# Patient Record
Sex: FEMALE | Race: WHITE | Employment: FULL TIME | URBAN - METROPOLITAN AREA
[De-identification: names, ages, dates, MRNs, and addresses within clinical notes are randomized per-mention and may not be internally consistent; named-entity substitution may affect disease eponyms.]

---

## 2018-03-04 ENCOUNTER — HOSPITAL ENCOUNTER (EMERGENCY)
Facility: CLINIC | Age: 38
Discharge: HOME | End: 2018-03-05
Attending: EMERGENCY MEDICINE
Payer: COMMERCIAL

## 2018-03-04 DIAGNOSIS — B02.9 HERPES ZOSTER WITHOUT COMPLICATION: Primary | ICD-10-CM

## 2018-03-04 PROCEDURE — 99282 EMERGENCY DEPT VISIT SF MDM: CPT

## 2018-03-04 ASSESSMENT — PAIN SCALES - GENERAL: PAINLEVEL_OUTOF10: 6

## 2018-03-04 ASSESSMENT — PAIN DESCRIPTION - FREQUENCY: FREQUENCY: CONTINUOUS

## 2018-03-04 ASSESSMENT — ENCOUNTER SYMPTOMS
GASTROINTESTINAL NEGATIVE: 1
RESPIRATORY NEGATIVE: 1
EYES NEGATIVE: 1

## 2018-03-04 ASSESSMENT — PAIN DESCRIPTION - DESCRIPTORS: DESCRIPTORS: SHARP;BURNING

## 2018-03-04 ASSESSMENT — PAIN DESCRIPTION - ORIENTATION: ORIENTATION: LEFT

## 2018-03-04 ASSESSMENT — PAIN DESCRIPTION - PAIN TYPE: TYPE: ACUTE PAIN

## 2018-03-04 ASSESSMENT — PAIN DESCRIPTION - LOCATION: LOCATION: FACE

## 2018-03-05 VITALS
BODY MASS INDEX: 38.57 KG/M2 | HEART RATE: 84 BPM | WEIGHT: 240 LBS | SYSTOLIC BLOOD PRESSURE: 131 MMHG | DIASTOLIC BLOOD PRESSURE: 88 MMHG | HEIGHT: 66 IN | TEMPERATURE: 98.3 F | RESPIRATION RATE: 15 BRPM | OXYGEN SATURATION: 98 %

## 2018-03-05 NOTE — ED NOTES
Pt states she was diagnosed with Shingles on Friday . Pt now c/o worsening pain and the rash spreading.  Pt also c/o left ear pain and was diagnosed with an daryl media and placed on Amoxicillin     Silvia Mendez RN  03/04/18 9879

## 2018-03-05 NOTE — ED PROVIDER NOTES
Examination the patient's left face does reveal a herpes zoster rash that is on the left-hand side of her face and is on her lips but not on her nose. This is not also involve the substance of the eye itself it does go up to the border of the lower eyelid. Eyes: Conjunctivae and EOM are normal. Pupils are equal, round, and reactive to light. Right eye exhibits no discharge. Left eye exhibits no discharge. No scleral icterus. Neck: Normal range of motion. Neck supple. Cardiovascular: Normal rate and regular rhythm. Pulmonary/Chest: Effort normal and breath sounds normal.   Abdominal: Soft. Bowel sounds are normal.   Musculoskeletal: Normal range of motion. Neurological: She is alert and oriented to person, place, and time. Skin: Skin is warm. Capillary refill takes 2 to 3 seconds. Psychiatric: She has a normal mood and affect. Vitals reviewed. DIFFERENTIAL DIAGNOSIS/ MDM:         DIAGNOSTIC RESULTS     EKG: All EKG's are interpreted by the Emergency Department Physician who either signs or Co-signs this chart in the absence of a cardiologist.        Not indicated unless otherwise documented above    LABS:  No results found for this visit on 03/04/18. Not indicated unless otherwise documented above    RADIOLOGY:   I reviewed the radiologist interpretations:  No orders to display       Not indicated unless otherwise documented above    EMERGENCY DEPARTMENT COURSE:     The patient was given the following medications:  No orders of the defined types were placed in this encounter.        Vitals:    Vitals:    03/04/18 2329 03/04/18 2332   BP:  (!) 136/98   Pulse: 90    Resp: 15    Temp: 98.3 °F (36.8 °C)    TempSrc: Oral    SpO2: 97%    Weight: 108.9 kg (240 lb)    Height: 5' 6\" (1.676 m)      -------------------------  BP (!) 136/98   Pulse 90   Temp 98.3 °F (36.8 °C) (Oral)   Resp 15   Ht 5' 6\" (1.676 m)   Wt 108.9 kg (240 lb)   LMP 02/24/2018   SpO2 97%   BMI 38.74 kg/m²         I have reviewed the disposition diagnosis with the patient and or their family/guardian. I have answered their questions and given discharge instructions. They voiced understanding of these instructions and did not have any further questions or complaints. CRITICAL CARE:    None    CONSULTS:    None    PROCEDURES:    None      OARRS Report if indicated             FINAL IMPRESSION      1.  Herpes zoster without complication          DISPOSITION/PLAN   DISPOSITION Decision To Discharge      PATIENT REFERRED TO:  Ortiz Crisostomo MD  [address], [address]  [facility] [address]  [phone]    In 1 day        DISCHARGE MEDICATIONS:  New Prescriptions    No medications on file       (Please note that portions of this note were completed with a voice recognition program.  Efforts were made to edit the dictations but occasionally words are mis-transcribed.)    Churchill MD  Attending Emergency Physician             Gustavo Tamayo MD  03/06/18 1191

## 2024-07-20 RX ORDER — M-VIT,TX,IRON,MINS/CALC/FOLIC 27MG-0.4MG
1 TABLET ORAL DAILY
COMMUNITY

## 2024-10-03 ENCOUNTER — INITIAL CONSULT (OUTPATIENT)
Age: 44
End: 2024-10-03
Payer: COMMERCIAL

## 2024-10-03 ENCOUNTER — HOSPITAL ENCOUNTER (OUTPATIENT)
Age: 44
Discharge: HOME | End: 2024-10-03
Payer: COMMERCIAL

## 2024-10-03 VITALS — DIASTOLIC BLOOD PRESSURE: 80 MMHG | SYSTOLIC BLOOD PRESSURE: 116 MMHG

## 2024-10-03 DIAGNOSIS — N94.6 DYSMENORRHEA: Primary | ICD-10-CM

## 2024-10-03 DIAGNOSIS — N92.0 MENORRHAGIA WITH REGULAR CYCLE: ICD-10-CM

## 2024-10-03 DIAGNOSIS — T83.32XD IUD MIGRATION, SUBSEQUENT ENCOUNTER: ICD-10-CM

## 2024-10-03 DIAGNOSIS — Z41.9 ELECTIVE SURGERY: ICD-10-CM

## 2024-10-03 LAB
ERYTHROCYTE [DISTWIDTH] IN BLOOD BY AUTOMATED COUNT: 17.4 % (ref 11.8–14.4)
HCT VFR BLD AUTO: 33.2 % (ref 36.3–47.1)
HGB BLD-MCNC: 9.5 G/DL (ref 11.9–15.1)
MCH RBC QN AUTO: 21.4 PG (ref 25.2–33.5)
MCHC RBC AUTO-ENTMCNC: 28.6 G/DL (ref 28.4–34.8)
MCV RBC AUTO: 74.9 FL (ref 82.6–102.9)
NRBC BLD-RTO: 0 PER 100 WBC
PLATELET # BLD AUTO: 380 K/UL (ref 138–453)
PMV BLD AUTO: 9.6 FL (ref 8.1–13.5)
RBC # BLD AUTO: 4.43 M/UL (ref 3.95–5.11)
WBC OTHER # BLD: 7.7 K/UL (ref 3.5–11.3)

## 2024-10-03 PROCEDURE — 99213 OFFICE O/P EST LOW 20 MIN: CPT | Performed by: OBSTETRICS & GYNECOLOGY

## 2024-10-03 PROCEDURE — G8484 FLU IMMUNIZE NO ADMIN: HCPCS | Performed by: OBSTETRICS & GYNECOLOGY

## 2024-10-03 PROCEDURE — 85027 COMPLETE CBC AUTOMATED: CPT

## 2024-10-03 PROCEDURE — G8421 BMI NOT CALCULATED: HCPCS | Performed by: OBSTETRICS & GYNECOLOGY

## 2024-10-03 PROCEDURE — 36415 COLL VENOUS BLD VENIPUNCTURE: CPT

## 2024-10-03 PROCEDURE — G8427 DOCREV CUR MEDS BY ELIG CLIN: HCPCS | Performed by: OBSTETRICS & GYNECOLOGY

## 2024-10-03 PROCEDURE — 1036F TOBACCO NON-USER: CPT | Performed by: OBSTETRICS & GYNECOLOGY

## 2024-10-03 RX ORDER — POLYETHYLENE GLYCOL 3350 17 G/17G
POWDER, FOR SOLUTION ORAL
COMMUNITY
Start: 2024-02-01

## 2024-10-03 NOTE — PATIENT INSTRUCTIONS
EKG, and Covid screen.  *If MRSA positive, please refer to the MRSA protocol below to obtain appropriate pre-operative therapies to use before your surgery.  *The anesthesiologist will talk with you at the time of your surgery regarding the specific type of anesthesia that will be administered.  Inform the anesthesiologist if you wear dentures, or have a family history of anesthetic complications.  *A member of the outpatient testing personnel will tell you where and what time to report on the day of your surgery.  *If instructed to drink carbohydrate beverages or nutrition shakes pre-operatively, please do so.    *Please bring your CPAP if used at home to the hospital.  This is important to prevent post-operative pneumonia and sleep apnea.  *Do not bring jewelry or valuables with you to the hospital.  *Stopping smoking in advance of surgery is strongly encouraged.  *STOP all blood thinners at least 5-7 days prior to your surgery.  This includes medicines such as Aspirin, Coumadin, Lovenox, Xarelto, Pradaxa, Effient, and Plavix.  *STOP all NSAID anti-inflammatories 2 days prior to surgery.  This includes medicines such as but not limited to Ibuprofen, Motrin, Aleve, Mobic, Celacoxib, and Voltaren.  *It is NOT necessary to stop hormones prior to major surgical procedures unless told otherwise.  This includes systemic birth control or post-menopausal estrogen, progesterone, testosterone, or DHEA.  Vaginal hormones are also allowed.  There is no evidence of increased risk of DVT/VTE while on hormones pre-operatively.      One Day Prior to Surgery     *ABSOLUTELY NO FOOD, LIQUID OR MEDICATION SHOULD BE TAKEN BY MOUTH AFTER MIDNIGHT OF THE DAY PRIOR TO SURGERY.  Please let Dr. Hall and the anesthesiologist know if you take any medicines required daily, have particular sensitivities to preps & medicines or surgical processes (positioning, induction, postop nausea), or have a chronic illness.        After Surgery    *The

## 2024-10-03 NOTE — PROGRESS NOTES
constipation / Anismus / Proctalgia fugax, laxative abuse, and prior treatment / surgery.    Topics of General Gynecology were revisited including gravity/parity, #'s, perineal delivery trauma, LMP, days of flow, heaviest days, # pads / tampons per day, cramps, anemia, discharge, prior STI's.    Flowsheet:  Screenings reviewed per Flowsheet including Pap smear, mammogram, dexascan, colonoscopy. Any screenings due will be ordered for this visit if patient agrees.      ROS:  Review of Systems  Pelvic pain, menstrual problem. All other systems negative.  PHYSICAL EXAM:  /80   LMP 10/02/2024 (Exact Date)     Physical Exam  Constitutional:       Appearance: Normal appearance. She is normal weight.   HENT:      Head: Normocephalic and atraumatic.      Right Ear: Tympanic membrane normal.      Left Ear: Tympanic membrane normal.      Nose: Nose normal.      Mouth/Throat:      Mouth: Mucous membranes are moist.      Pharynx: Oropharynx is clear.   Eyes:      Extraocular Movements: Extraocular movements intact.      Conjunctiva/sclera: Conjunctivae normal.   Cardiovascular:      Rate and Rhythm: Normal rate and regular rhythm.   Pulmonary:      Effort: Pulmonary effort is normal.      Breath sounds: Normal breath sounds.   Abdominal:      General: Abdomen is flat. Bowel sounds are normal.   Musculoskeletal:         General: Normal range of motion.      Cervical back: Normal range of motion and neck supple.   Neurological:      Mental Status: She is oriented to person, place, and time.   Psychiatric:         Mood and Affect: Mood normal.         Behavior: Behavior normal.   Vitals and nursing note reviewed.          VISIT RESULTS:  No results found for any visits on 10/03/24.     ASSESSMENT/PLAN:    Dysmenorrhea  Menorrhagia with regular cycle  IUD migration, subsequent encounter  Elective surgery       The patient was counseled regarding review of all conditions discussed.     Recommend Hysteroscopy D&C with IUD

## 2024-10-17 ENCOUNTER — HOSPITAL ENCOUNTER (OUTPATIENT)
Age: 44
Discharge: HOME | End: 2024-10-17
Payer: COMMERCIAL

## 2024-10-17 LAB
ABO + RH BLD: NORMAL
ARM BAND NUMBER: NORMAL
BLOOD BANK SAMPLE EXPIRATION: NORMAL
BLOOD GROUP ANTIBODIES SERPL: NEGATIVE

## 2024-10-17 PROCEDURE — 86850 RBC ANTIBODY SCREEN: CPT

## 2024-10-17 PROCEDURE — 86901 BLOOD TYPING SEROLOGIC RH(D): CPT

## 2024-10-17 PROCEDURE — 86900 BLOOD TYPING SEROLOGIC ABO: CPT

## 2024-10-17 PROCEDURE — 36415 COLL VENOUS BLD VENIPUNCTURE: CPT

## 2024-10-18 ENCOUNTER — ANESTHESIA EVENT (OUTPATIENT)
Dept: OPERATING ROOM | Age: 44
End: 2024-10-18
Payer: COMMERCIAL

## 2024-10-21 ENCOUNTER — ANESTHESIA (OUTPATIENT)
Dept: OPERATING ROOM | Age: 44
End: 2024-10-21
Payer: COMMERCIAL

## 2024-10-21 ENCOUNTER — HOSPITAL ENCOUNTER (OUTPATIENT)
Age: 44
Setting detail: OUTPATIENT SURGERY
Discharge: HOME | End: 2024-10-21
Attending: OBSTETRICS & GYNECOLOGY | Admitting: OBSTETRICS & GYNECOLOGY
Payer: COMMERCIAL

## 2024-10-21 ENCOUNTER — APPOINTMENT (OUTPATIENT)
Dept: GENERAL RADIOLOGY | Age: 44
End: 2024-10-21
Attending: OBSTETRICS & GYNECOLOGY
Payer: COMMERCIAL

## 2024-10-21 VITALS
SYSTOLIC BLOOD PRESSURE: 147 MMHG | HEART RATE: 73 BPM | DIASTOLIC BLOOD PRESSURE: 96 MMHG | OXYGEN SATURATION: 99 % | TEMPERATURE: 98 F | HEIGHT: 66 IN | WEIGHT: 252 LBS | BODY MASS INDEX: 40.5 KG/M2 | RESPIRATION RATE: 16 BRPM

## 2024-10-21 DIAGNOSIS — N94.6 DYSMENORRHEA: ICD-10-CM

## 2024-10-21 DIAGNOSIS — Z97.5 IUD (INTRAUTERINE DEVICE) IN PLACE: Primary | ICD-10-CM

## 2024-10-21 DIAGNOSIS — T83.32XD DISPLACEMENT OF INTRAUTERINE CONTRACEPTIVE DEVICE, SUBSEQUENT ENCOUNTER: ICD-10-CM

## 2024-10-21 DIAGNOSIS — G89.18 POST-OP PAIN: Primary | ICD-10-CM

## 2024-10-21 LAB — HCG, PREGNANCY URINE (POC): NEGATIVE

## 2024-10-21 PROCEDURE — 3600000004 HC SURGERY LEVEL 4 BASE: Performed by: OBSTETRICS & GYNECOLOGY

## 2024-10-21 PROCEDURE — 6360000002 HC RX W HCPCS: Performed by: SPECIALIST

## 2024-10-21 PROCEDURE — 3600000014 HC SURGERY LEVEL 4 ADDTL 15MIN: Performed by: OBSTETRICS & GYNECOLOGY

## 2024-10-21 PROCEDURE — 88305 TISSUE EXAM BY PATHOLOGIST: CPT

## 2024-10-21 PROCEDURE — 7100000000 HC PACU RECOVERY - FIRST 15 MIN: Performed by: OBSTETRICS & GYNECOLOGY

## 2024-10-21 PROCEDURE — 2709999900 HC NON-CHARGEABLE SUPPLY: Performed by: OBSTETRICS & GYNECOLOGY

## 2024-10-21 PROCEDURE — 2580000003 HC RX 258: Performed by: STUDENT IN AN ORGANIZED HEALTH CARE EDUCATION/TRAINING PROGRAM

## 2024-10-21 PROCEDURE — 7100000001 HC PACU RECOVERY - ADDTL 15 MIN: Performed by: OBSTETRICS & GYNECOLOGY

## 2024-10-21 PROCEDURE — 81025 URINE PREGNANCY TEST: CPT

## 2024-10-21 PROCEDURE — 2500000003 HC RX 250 WO HCPCS: Performed by: SPECIALIST

## 2024-10-21 PROCEDURE — C1765 ADHESION BARRIER: HCPCS | Performed by: OBSTETRICS & GYNECOLOGY

## 2024-10-21 PROCEDURE — 3700000001 HC ADD 15 MINUTES (ANESTHESIA): Performed by: OBSTETRICS & GYNECOLOGY

## 2024-10-21 PROCEDURE — 7100000010 HC PHASE II RECOVERY - FIRST 15 MIN: Performed by: OBSTETRICS & GYNECOLOGY

## 2024-10-21 PROCEDURE — 6360000002 HC RX W HCPCS: Performed by: OBSTETRICS & GYNECOLOGY

## 2024-10-21 PROCEDURE — 3700000000 HC ANESTHESIA ATTENDED CARE: Performed by: OBSTETRICS & GYNECOLOGY

## 2024-10-21 PROCEDURE — 72170 X-RAY EXAM OF PELVIS: CPT

## 2024-10-21 PROCEDURE — 7100000011 HC PHASE II RECOVERY - ADDTL 15 MIN: Performed by: OBSTETRICS & GYNECOLOGY

## 2024-10-21 RX ORDER — SODIUM CHLORIDE 0.9 % (FLUSH) 0.9 %
5-40 SYRINGE (ML) INJECTION EVERY 12 HOURS SCHEDULED
Status: DISCONTINUED | OUTPATIENT
Start: 2024-10-21 | End: 2024-10-21 | Stop reason: HOSPADM

## 2024-10-21 RX ORDER — SODIUM CHLORIDE, SODIUM LACTATE, POTASSIUM CHLORIDE, CALCIUM CHLORIDE 600; 310; 30; 20 MG/100ML; MG/100ML; MG/100ML; MG/100ML
INJECTION, SOLUTION INTRAVENOUS CONTINUOUS
Status: DISCONTINUED | OUTPATIENT
Start: 2024-10-21 | End: 2024-10-21 | Stop reason: HOSPADM

## 2024-10-21 RX ORDER — OXYCODONE AND ACETAMINOPHEN 5; 325 MG/1; MG/1
2 TABLET ORAL
Status: DISCONTINUED | OUTPATIENT
Start: 2024-10-21 | End: 2024-10-21 | Stop reason: HOSPADM

## 2024-10-21 RX ORDER — ROCURONIUM BROMIDE 10 MG/ML
INJECTION, SOLUTION INTRAVENOUS
Status: DISCONTINUED | OUTPATIENT
Start: 2024-10-21 | End: 2024-10-21 | Stop reason: SDUPTHER

## 2024-10-21 RX ORDER — ONDANSETRON 4 MG/1
4 TABLET, FILM COATED ORAL 4 TIMES DAILY PRN
Qty: 20 TABLET | Refills: 0 | Status: SHIPPED | OUTPATIENT
Start: 2024-10-21 | End: 2024-10-26

## 2024-10-21 RX ORDER — DEXAMETHASONE SODIUM PHOSPHATE 10 MG/ML
INJECTION, SOLUTION INTRAMUSCULAR; INTRAVENOUS
Status: DISCONTINUED | OUTPATIENT
Start: 2024-10-21 | End: 2024-10-21 | Stop reason: SDUPTHER

## 2024-10-21 RX ORDER — HYDRALAZINE HYDROCHLORIDE 20 MG/ML
10 INJECTION INTRAMUSCULAR; INTRAVENOUS
Status: DISCONTINUED | OUTPATIENT
Start: 2024-10-21 | End: 2024-10-21 | Stop reason: HOSPADM

## 2024-10-21 RX ORDER — SODIUM CHLORIDE 0.9 % (FLUSH) 0.9 %
5-40 SYRINGE (ML) INJECTION PRN
Status: DISCONTINUED | OUTPATIENT
Start: 2024-10-21 | End: 2024-10-21 | Stop reason: HOSPADM

## 2024-10-21 RX ORDER — IBUPROFEN 600 MG/1
600 TABLET, FILM COATED ORAL 3 TIMES DAILY PRN
Qty: 90 TABLET | Refills: 0 | Status: SHIPPED | OUTPATIENT
Start: 2024-10-21 | End: 2024-11-20

## 2024-10-21 RX ORDER — ONDANSETRON 2 MG/ML
4 INJECTION INTRAMUSCULAR; INTRAVENOUS
Status: DISCONTINUED | OUTPATIENT
Start: 2024-10-21 | End: 2024-10-21 | Stop reason: HOSPADM

## 2024-10-21 RX ORDER — DIPHENHYDRAMINE HYDROCHLORIDE 50 MG/ML
12.5 INJECTION INTRAMUSCULAR; INTRAVENOUS
Status: DISCONTINUED | OUTPATIENT
Start: 2024-10-21 | End: 2024-10-21 | Stop reason: HOSPADM

## 2024-10-21 RX ORDER — MORPHINE SULFATE 2 MG/ML
2 INJECTION, SOLUTION INTRAMUSCULAR; INTRAVENOUS EVERY 5 MIN PRN
Status: DISCONTINUED | OUTPATIENT
Start: 2024-10-21 | End: 2024-10-21 | Stop reason: HOSPADM

## 2024-10-21 RX ORDER — GLYCOPYRROLATE 0.2 MG/ML
0.4 INJECTION INTRAMUSCULAR; INTRAVENOUS ONCE
Status: DISCONTINUED | OUTPATIENT
Start: 2024-10-21 | End: 2024-10-21 | Stop reason: HOSPADM

## 2024-10-21 RX ORDER — MIDAZOLAM HYDROCHLORIDE 2 MG/2ML
2 INJECTION, SOLUTION INTRAMUSCULAR; INTRAVENOUS
Status: DISCONTINUED | OUTPATIENT
Start: 2024-10-21 | End: 2024-10-21 | Stop reason: HOSPADM

## 2024-10-21 RX ORDER — SODIUM CHLORIDE 9 MG/ML
INJECTION, SOLUTION INTRAVENOUS PRN
Status: DISCONTINUED | OUTPATIENT
Start: 2024-10-21 | End: 2024-10-21 | Stop reason: HOSPADM

## 2024-10-21 RX ORDER — OXYCODONE AND ACETAMINOPHEN 5; 325 MG/1; MG/1
1 TABLET ORAL EVERY 6 HOURS PRN
Qty: 8 TABLET | Refills: 0 | Status: SHIPPED | OUTPATIENT
Start: 2024-10-21 | End: 2024-10-23

## 2024-10-21 RX ORDER — PROPOFOL 10 MG/ML
INJECTION, EMULSION INTRAVENOUS
Status: DISCONTINUED | OUTPATIENT
Start: 2024-10-21 | End: 2024-10-21 | Stop reason: SDUPTHER

## 2024-10-21 RX ORDER — METOCLOPRAMIDE HYDROCHLORIDE 5 MG/ML
10 INJECTION INTRAMUSCULAR; INTRAVENOUS
Status: DISCONTINUED | OUTPATIENT
Start: 2024-10-21 | End: 2024-10-21 | Stop reason: HOSPADM

## 2024-10-21 RX ORDER — OXYCODONE AND ACETAMINOPHEN 5; 325 MG/1; MG/1
1 TABLET ORAL
Status: DISCONTINUED | OUTPATIENT
Start: 2024-10-21 | End: 2024-10-21 | Stop reason: HOSPADM

## 2024-10-21 RX ORDER — IPRATROPIUM BROMIDE AND ALBUTEROL SULFATE 2.5; .5 MG/3ML; MG/3ML
1 SOLUTION RESPIRATORY (INHALATION)
Status: DISCONTINUED | OUTPATIENT
Start: 2024-10-21 | End: 2024-10-21 | Stop reason: HOSPADM

## 2024-10-21 RX ORDER — FENTANYL CITRATE 50 UG/ML
INJECTION, SOLUTION INTRAMUSCULAR; INTRAVENOUS
Status: DISCONTINUED | OUTPATIENT
Start: 2024-10-21 | End: 2024-10-21 | Stop reason: SDUPTHER

## 2024-10-21 RX ORDER — KETOROLAC TROMETHAMINE 30 MG/ML
INJECTION, SOLUTION INTRAMUSCULAR; INTRAVENOUS
Status: DISCONTINUED | OUTPATIENT
Start: 2024-10-21 | End: 2024-10-21 | Stop reason: SDUPTHER

## 2024-10-21 RX ORDER — NALOXONE HYDROCHLORIDE 0.4 MG/ML
INJECTION, SOLUTION INTRAMUSCULAR; INTRAVENOUS; SUBCUTANEOUS PRN
Status: DISCONTINUED | OUTPATIENT
Start: 2024-10-21 | End: 2024-10-21 | Stop reason: HOSPADM

## 2024-10-21 RX ORDER — LABETALOL HYDROCHLORIDE 5 MG/ML
10 INJECTION, SOLUTION INTRAVENOUS
Status: DISCONTINUED | OUTPATIENT
Start: 2024-10-21 | End: 2024-10-21 | Stop reason: HOSPADM

## 2024-10-21 RX ORDER — LIDOCAINE HYDROCHLORIDE 10 MG/ML
INJECTION, SOLUTION EPIDURAL; INFILTRATION; INTRACAUDAL; PERINEURAL
Status: DISCONTINUED | OUTPATIENT
Start: 2024-10-21 | End: 2024-10-21 | Stop reason: SDUPTHER

## 2024-10-21 RX ORDER — LIDOCAINE HYDROCHLORIDE 10 MG/ML
1 INJECTION, SOLUTION EPIDURAL; INFILTRATION; INTRACAUDAL; PERINEURAL
Status: DISCONTINUED | OUTPATIENT
Start: 2024-10-21 | End: 2024-10-21 | Stop reason: HOSPADM

## 2024-10-21 RX ORDER — BUPIVACAINE HYDROCHLORIDE AND EPINEPHRINE 5; 5 MG/ML; UG/ML
INJECTION, SOLUTION PERINEURAL
Status: DISCONTINUED
Start: 2024-10-21 | End: 2024-10-21 | Stop reason: HOSPADM

## 2024-10-21 RX ORDER — MEPERIDINE HYDROCHLORIDE 50 MG/ML
12.5 INJECTION INTRAMUSCULAR; INTRAVENOUS; SUBCUTANEOUS EVERY 5 MIN PRN
Status: DISCONTINUED | OUTPATIENT
Start: 2024-10-21 | End: 2024-10-21 | Stop reason: HOSPADM

## 2024-10-21 RX ORDER — ONDANSETRON 2 MG/ML
INJECTION INTRAMUSCULAR; INTRAVENOUS
Status: DISCONTINUED | OUTPATIENT
Start: 2024-10-21 | End: 2024-10-21 | Stop reason: SDUPTHER

## 2024-10-21 RX ORDER — MIDAZOLAM HYDROCHLORIDE 1 MG/ML
INJECTION, SOLUTION INTRAMUSCULAR; INTRAVENOUS
Status: DISCONTINUED | OUTPATIENT
Start: 2024-10-21 | End: 2024-10-21 | Stop reason: SDUPTHER

## 2024-10-21 RX ADMIN — MIDAZOLAM 2 MG: 1 INJECTION INTRAMUSCULAR; INTRAVENOUS at 11:25

## 2024-10-21 RX ADMIN — KETOROLAC TROMETHAMINE 30 MG: 30 INJECTION, SOLUTION INTRAMUSCULAR at 13:28

## 2024-10-21 RX ADMIN — ONDANSETRON 4 MG: 2 INJECTION INTRAMUSCULAR; INTRAVENOUS at 13:28

## 2024-10-21 RX ADMIN — DEXAMETHASONE SODIUM PHOSPHATE 10 MG: 10 INJECTION, SOLUTION INTRAMUSCULAR; INTRAVENOUS at 11:36

## 2024-10-21 RX ADMIN — LIDOCAINE HYDROCHLORIDE 50 MG: 10 INJECTION, SOLUTION EPIDURAL; INFILTRATION; INTRACAUDAL; PERINEURAL at 11:29

## 2024-10-21 RX ADMIN — FENTANYL CITRATE 50 MCG: 50 INJECTION, SOLUTION INTRAMUSCULAR; INTRAVENOUS at 11:29

## 2024-10-21 RX ADMIN — SODIUM CHLORIDE, POTASSIUM CHLORIDE, SODIUM LACTATE AND CALCIUM CHLORIDE: 600; 310; 30; 20 INJECTION, SOLUTION INTRAVENOUS at 11:25

## 2024-10-21 RX ADMIN — FENTANYL CITRATE 50 MCG: 50 INJECTION, SOLUTION INTRAMUSCULAR; INTRAVENOUS at 11:51

## 2024-10-21 RX ADMIN — ROCURONIUM BROMIDE 40 MG: 10 INJECTION, SOLUTION INTRAVENOUS at 11:29

## 2024-10-21 RX ADMIN — ROCURONIUM BROMIDE 10 MG: 10 INJECTION, SOLUTION INTRAVENOUS at 12:15

## 2024-10-21 RX ADMIN — PROPOFOL 150 MG: 10 INJECTION, EMULSION INTRAVENOUS at 11:29

## 2024-10-21 RX ADMIN — ROCURONIUM BROMIDE 10 MG: 10 INJECTION, SOLUTION INTRAVENOUS at 12:45

## 2024-10-21 RX ADMIN — SODIUM CHLORIDE, PRESERVATIVE FREE 10 ML: 5 INJECTION INTRAVENOUS at 10:01

## 2024-10-21 RX ADMIN — SUGAMMADEX 250 MG: 100 INJECTION, SOLUTION INTRAVENOUS at 13:28

## 2024-10-21 RX ADMIN — ROCURONIUM BROMIDE 10 MG: 10 INJECTION, SOLUTION INTRAVENOUS at 12:04

## 2024-10-21 ASSESSMENT — PAIN DESCRIPTION - DESCRIPTORS
DESCRIPTORS: CRAMPING

## 2024-10-21 ASSESSMENT — PAIN - FUNCTIONAL ASSESSMENT
PAIN_FUNCTIONAL_ASSESSMENT: 0-10
PAIN_FUNCTIONAL_ASSESSMENT: 0-10

## 2024-10-21 ASSESSMENT — PAIN DESCRIPTION - PAIN TYPE
TYPE: SURGICAL PAIN

## 2024-10-21 ASSESSMENT — PAIN DESCRIPTION - LOCATION
LOCATION: ABDOMEN

## 2024-10-21 ASSESSMENT — PAIN SCALES - GENERAL
PAINLEVEL_OUTOF10: 4

## 2024-10-21 NOTE — H&P
informed consent has been signed under no duress or confusion and was reviewed one final time.  All questions were answered to the patient's satisfaction.      The patient confirmed they read written information and instructions prior to their surgery as part of their responsibility in understanding their surgery and recuperation: Yes    The patient confirmed they watched video information and instructions prior to their surgery as part of their responsibility in understanding their surgery and recuperation: Yes          SHADE

## 2024-10-21 NOTE — DISCHARGE INSTRUCTIONS
17     HOME SUPPLY LIST      Please contact your local pharmacy or medical supply store regarding supplies for the type of catheter you may have.      Transurethral Laird catheter   1 Night bag and 1 Day bag with leg strap   Lubricating jelly   Alcohol wipes   Measuring hat for toilet seat   5 50 mL syringes      Self-Intermittent Catheterization Supplies:   100 #11-Chinese female catheters (hydrophilic if possible)   Lubricating jelly   Alcohol wipes   Measuring hat for toilet seat   Hand-held mirror      Suprapubic catheter kit   1 Roll Transpire tape 1 inch ×10 yards.   1.  Box of 25 drain dressings, precut, 6 pi 4 x 4   1 package catheter plugs   1.  2000 mL urinary drainage bag   1 day bag with leg strap   1 60 mL syringe catheter tip   1 Quart vinegar   1 box appropriately sized clinical exam gloves.     You may obtain the above supplies at your local pharmacy or medical supply shop.     Call the pharmacy or medical supply shop first to check availability and pricing.       Activity  You have had anesthesia today  Do not drive, operate heavy equipment, consume alcoholic beverages, or make any important decisions  for 24 hours   If you are taking pain medication: Do not drive or consume alcohol.  Take your time changing positions today. You may feel light headed or dizzy if you move too quickly.   Continue your home medications as ordered by your physician.  Diet   You can eat your normal diet when you feel well. You should start off with bland foods like chicken soup, toast, or yogurt. Then advance as tolerated.  Drink plenty of fluids (unless your doctor tells you not to). Your urine should be very lightly colored without a strong odor.

## 2024-10-21 NOTE — OP NOTE
Operative Note      Patient: Pal Harrington  YOB: 1980  MRN: [id number]    Date of Procedure: 10/21/2024    Pre-Op Diagnosis Codes:      * Displacement of intrauterine contraceptive device, subsequent encounter [T83.32XD]     * Dysmenorrhea [N94.6]    Post-Op Diagnosis: Same + no operative or radiographic evidence of remaining IUD arm from elsewhere, bilateral ovarian cysts with right endometrioma, left hydrosalpinx with paratubal cysts and fimbrial clubbing, stage IV endometriosis, adenomyosis       Procedure(s):  DILATATION AND CURETTAGE HYSTEROSCOPY DIAGNOSTIC LAPAROSCOPY  INSERTION OF NEW MEDICALLY NECESSARY INTRAUTERINE UTERINE DEVICE, BILATERAL EXCISION OF OVARIAN CYSTS, EXCISED PELVIC ENDOMETRIOSIS    Difficult robotic assisted laparoscopy     Surgeon(s):  Wilbert Hall DO    Assistant:   Resident: Kirk Gongora MD    Anesthesia: General    Estimated Blood Loss (mL): Minimal    Complications: None    Specimens:   ID Type Source Tests Collected by Time Destination   A : ENDOMETRIAL CURETTINGS Tissue Tissue SURGICAL PATHOLOGY Wilbert Hall,  10/21/2024 1205    B : LEFT OVARIAN CYST, LEFT PARATUBAL CYST, LEFT FALLOPIAN TUBE Tissue Tissue SURGICAL PATHOLOGY Wilbert Hall,  10/21/2024 1230    C : BIOPSY.  ENDOMETRIOSIS OF THE PELVIS Tissue Tissue SURGICAL PATHOLOGY Wilbert Hall,  10/21/2024 1237    D : EXCISION OF RIGHT ENDOMETRIOMA Tissue Tissue SURGICAL PATHOLOGY Wilbert Hall,  10/21/2024 1239    E : CULDE SAC ENDOMETRIOSIS Tissue Tissue SURGICAL PATHOLOGY Wilbert Hall,  10/21/2024 1242        Implants:  * No implants in log *      Drains:   [REMOVED] Urinary Catheter 10/21/24 Laird (Removed)       Findings:  Infection Present At Time Of Surgery (PATOS) (choose all levels that have infection present):  No infection present  Other Findings: as above    Detailed Description of Procedure:   The patient is being admitted for a planned elective surgical procedure today. The

## 2024-10-21 NOTE — BRIEF OP NOTE
Brief Operative Note  Department of Obstetrics and Gynecology  [facility]     Patient: Pal Harrington   : 1980  MRN: [id number]       Acct: [id number]   Date of Procedure: 10/20/24     Pre-operative Diagnosis: 44 y.o. female     Dysmenorrhea  Menorrhagia  Migrated IUD  BMI 38    Post-operative Diagnosis: same as above  Dysmenorrhea  Menorrhagia  Migrated IUD  BMI 38    Procedure: dilatation and curettage hysteroscopy diagnostic laparoscopy, with left salpingectomy, excision of pelvic endometriosis, bilateral excision of ovarian cysts, insertion of new medically necessary intrauterine uterine device    Surgeon: Dr. Hall     Assistant(s): Kirk Gongora MD, PGY4    Anesthesia: General    Findings:  Normal appearing external genitalia, vaginal mucosa and cervix. Normal appearing endocervical and endometrial canal without evidence of retained IUD. Bilateral tubal ostia seen. Normal appearance of uterus. Adhesions of left fallopian tube with hydrosalpinx to left ovary and sigmoid colon. Left ovary with benign functional cyst. Small right ovarian endometrioma.   Total IV fluids/Blood products:  500 ml crystalloid  Urine Output:  500 ml    Estimated blood loss:  15 mL  Drains:  none  Specimens:  excised pelvic endometriosis biopsies, endometrial curettings, left fallopian tube, left ovarian cyst, right ovarian endometrioma, left fallopian tube hydrosalpinx  Instrument and Sponge Count: Correct  Complications:  none  Condition:  good, transferred to post anesthesia recovery    Kirk Gongora MD  Ob/Gyn Resident  10/21/2024, 1:52 PM

## 2024-10-21 NOTE — DISCHARGE SUMMARY
Urogynecology Discharge Summary  [facility]      Patient Name: Pal Harrington  Patient : 1980  Primary Care Physician: Bang Ortiz MD  Admit Date: 10/21/2024    Principal Diagnosis: dysmenorrhea, menorrhagia, migrated IUD    Other Diagnosis:   Displacement of intrauterine contraceptive device, subsequent encounter [T83.32XD]  Dysmenorrhea [N94.6]  There is no problem list on file for this patient.    Infection: No  Hospital Acquired: No    Surgical Operations & Procedures: dilatation and curettage hysteroscopy diagnostic laparoscopy, with left salpingectomy, excision of pelvic endometriosis, bilateral excision of ovarian cysts, insertion of new medically necessary intrauterine uterine device     Consultations: Anesthesia    Pertinent Findings & Procedures:   Pal Harrington is a 44 y.o. female , admitted for surgical evaluation and management of dysmenorrhea, menorrhagia, migrated IUD.  She underwent dilatation and curettage hysteroscopy diagnostic laparoscopy, with left salpingectomy, excision of pelvic endometriosis, bilateral excision of ovarian cysts, insertion of new medically necessary intrauterine uterine device on 10/21/24. She was discharged home without a arguello catheter. Post-op course normal, discharged home on POD# 0.  Follow up in 1 week. Discharge instructions reviewed and questions answered.    Course of patient: normal    Discharge to: Home    Readmission planned: No    Recommendations on Discharge:     Medications:     Medication List        START taking these medications      ibuprofen 600 MG tablet  Commonly known as: ADVIL;MOTRIN  Take 1 tablet by mouth 3 times daily as needed for Pain     ondansetron 4 MG tablet  Commonly known as: ZOFRAN  Take 1 tablet by mouth 4 times daily as needed for Nausea or Vomiting     oxyCODONE-acetaminophen 5-325 MG per tablet  Commonly known as: Percocet  Take 1 tablet by mouth every 6 hours as needed for Pain for up to 2 days.

## 2024-10-21 NOTE — ANESTHESIA POSTPROCEDURE EVALUATION
Department of Anesthesiology  Postprocedure Note    Patient: Pal Harrington  MRN: [id number]  YOB: 1980  Date of evaluation: 10/21/2024    Procedure Summary       Date: 10/21/24 Room / Location: [address]    Anesthesia Start: 1125 Anesthesia Stop: 1348    Procedures:       DILATATION AND CURETTAGE HYSTEROSCOPY DIAGNOSTIC LAPAROSCOPY      INSERTION OF NEW MEDICALLY NECESSARY INTRAUTERINE UTERINE DEVICE, BILATERAL EXCISION OF OVARIAN CYSTS, EXCISED PELVIC ENDOMETRIOSIS Diagnosis:       Displacement of intrauterine contraceptive device, subsequent encounter      Dysmenorrhea      (Displacement of intrauterine contraceptive device, subsequent encounter [T83.32XD])      (Dysmenorrhea [N94.6])    Surgeons: Wilbert Hall DO Responsible Provider: Yakov Fink MD    Anesthesia Type: general ASA Status: 1            Anesthesia Type: No value filed.    Cherie Phase I: Cherie Score: 9    Cherie Phase II:      Anesthesia Post Evaluation    Patient location during evaluation: PACU  Patient participation: complete - patient participated  Level of consciousness: awake and alert  Airway patency: patent  Nausea & Vomiting: no nausea and no vomiting  Cardiovascular status: hemodynamically stable  Respiratory status: room air and spontaneous ventilation  Hydration status: euvolemic  Multimodal analgesia pain management approach  Pain management: adequate    No notable events documented.

## 2024-10-21 NOTE — ANESTHESIA PRE PROCEDURE
Department of Anesthesiology  Preprocedure Note       Name:  Pal Harrington   Age:  44 y.o.  :  1980                                          MRN:  [id number]         Date:  10/21/2024      Surgeon: Surgeon(s):  Wilbert Hall DO    Procedure: Procedure(s):  DILATATION AND CURETTAGE HYSTEROSCOPY DIAGNOSTIC LAPAROSCOPY  UTERUS INTRAUTERINE DEVICE REMOVAL AND INSERTION OF NEW MEDICALLY NECESSARY INTRAUTERINE UTERINE DEVICE    Medications prior to admission:   Prior to Admission medications    Medication Sig Start Date End Date Taking? Authorizing Provider   Probiotic Product (PROBIOTIC BLEND PO) Take by mouth   Yes ProviderWarner MD   polyethylene glycol (MIRALAX) 17 GM/SCOOP powder  24  Yes ProviderWarner MD   Multiple Vitamins-Minerals (THERAPEUTIC MULTIVITAMIN-MINERALS) tablet Take 1 tablet by mouth daily   Yes ProviderWarner MD       Current medications:    Current Facility-Administered Medications   Medication Dose Route Frequency Provider Last Rate Last Admin   • lidocaine PF 1 % injection 1 mL  1 mL IntraDERmal Once PRN Lakisha Tan MD       • lactated ringers IV soln infusion SOLN   IntraVENous Continuous Lakisha Tan MD       • sodium chloride flush 0.9 % injection 5-40 mL  5-40 mL IntraVENous 2 times per day Lakisha Tan MD       • sodium chloride flush 0.9 % injection 5-40 mL  5-40 mL IntraVENous PRN Lakisha Tan MD   10 mL at 10/21/24 1001   • 0.9 % sodium chloride infusion   IntraVENous PRN Lakisha Tan MD           Allergies:  No Known Allergies    Problem List:  There is no problem list on file for this patient.      Past Medical History:        Diagnosis Date   • Constipation    • Depression    • Dysmenorrhea    • IUD (intrauterine device) in place    • Nocturia more than twice per night    • Other specified retained foreign body fragments    • Painful urination    • Pelvic pain    • Perimenopause    • Urinary frequency    • Urinary retention    • Urinary  Statement Selected

## 2024-10-23 LAB — SURGICAL PATHOLOGY REPORT: NORMAL

## 2024-10-25 ENCOUNTER — TELEPHONE (OUTPATIENT)
Age: 44
End: 2024-10-25

## 2024-10-25 NOTE — TELEPHONE ENCOUNTER
POST OP CALL RECORD      Date:  10/25/2024   Time:  11:10 AM   Patient:  Pal Harrington   :  1980   Procedure:   DILATATION AND CURETTAGE HYSTEROSCOPY DIAGNOSTIC LAPAROSCOPY  INSERTION OF NEW MEDICALLY NECESSARY INTRAUTERINE UTERINE DEVICE, BILATERAL EXCISION OF OVARIAN CYSTS, EXCISED PELVIC ENDOMETRIOSIS     Procedure Date: 10/21/2024     How are you feeling?  Pretty good   Are you voiding ok?  Yes  Is your Catheter draining?   N/a  Passing Gas?  Yes  Last BM?  10/24/2024  If no BM, what have you done to encourage?  Stool Softeners  Any Nausea/Vomiting/Diarrhea?  No    Diet: normal  Fever:  No  Pain: Yes, 2  What pain medication are you taking? Ibuprofen 600 mg   Post-Op visit Scheduled for follow up? 10/28/2024     Comments?  Patient is doing well. Patient did not have to fo home with a catheter and is voiding without difficulty. Patient has passed gas and has had a bowel movement since surgery. Patient denies any fever, n/v/d. Patient is controlling her pain with Ibuprofen 600 mg. Informed patient of post op appt date and time and how to reach the on call Dr. Bowser v/u

## 2024-10-28 ENCOUNTER — OFFICE VISIT (OUTPATIENT)
Age: 44
End: 2024-10-28

## 2024-10-28 VITALS — DIASTOLIC BLOOD PRESSURE: 102 MMHG | OXYGEN SATURATION: 97 % | HEART RATE: 68 BPM | SYSTOLIC BLOOD PRESSURE: 160 MMHG

## 2024-10-28 DIAGNOSIS — Z30.431 IUD CHECK UP: ICD-10-CM

## 2024-10-28 DIAGNOSIS — Z09 POSTOPERATIVE EXAMINATION: Primary | ICD-10-CM

## 2024-10-28 DIAGNOSIS — R68.89 BLOOD PRESSURE ALTERATION: ICD-10-CM

## 2024-10-28 DIAGNOSIS — F41.9 ANXIETY: ICD-10-CM

## 2024-10-28 PROCEDURE — 99024 POSTOP FOLLOW-UP VISIT: CPT

## 2024-10-28 RX ORDER — ESCITALOPRAM OXALATE 10 MG/1
10 TABLET ORAL DAILY
Qty: 30 TABLET | Refills: 1 | Status: SHIPPED | OUTPATIENT
Start: 2024-10-28

## 2024-10-28 ASSESSMENT — ENCOUNTER SYMPTOMS: GASTROINTESTINAL NEGATIVE: 1

## 2024-10-28 NOTE — PROGRESS NOTES
One week post-op    Surgery Type: n/a   Surgery Date: n/a     Date urinary cathter was pulled\" n/a  Voiding w/o difficulty:no    Level of pain: 2  Pain medication;   Ibuprofen: 600mg  Tylenol:  n/a  Norco:not taking, date of last dose: n/a  Percocet: not taking, date of last dose: n/a    Stool softener: taking  Last bowel movement: 10/27/2024  Bowel movement description: soft and formed    Antibiotics given: no  Antibiotics completed: n/a    Did patient use:   Simethicone no  Flomax no    Vaginal bleeding: yes  Discharge: no   
the patient is off of narcotic medicines and pain is minimal, she may drive short distances with a seat belt <30 minutes. The patient understands to test herself in a parked car by slamming on the brake; if not tender, she may drive.    Urination: The patient knows to urinate frequently and use double voiding measures to empty the bladder as best as she is able. She knows to hydrate by drinking up to 64-96 ounces of non-caffeinated fluids. The patient knows to call with symptoms of retention, urgency-frequency, painful voiding, uti symptoms, or gross hematuria.    Avoid constipation.  The patient understands this may be avoided by increasing activity and maintaining a high fiber diet (unless otherwise instructed by the practitioner).  The more activity the greater the probability bowels will work properly.  Increase fluid intake, preferably water 64-96 ounces a day.  Use a vegetable non-stimulant stool softener when necessary and try to avoid long-term laxatives.    The patient may resume intercourse at the 2 week kymberly for outpatient surgeries, 6 week kymberly for larger reconstructive surgeries, and 8 weeks for robotic surgeries.  If postoperative vaginal swelling, pain, or bleeding persists patient may wish to delay intercourse 2-4 weeks.  Just as a hip replacement requires stretching and therapeutic use, so does the operated vagina.  The patient understands that there are nonsurgical options to help her should she have painful intercourse such as lubrication and dilator therapy.     It is okay to resume light exercise like walking right away.  For patients with prolapse repairs it is recommended not to exercise heavily or lift objects heavier than 15-20 pounds until her final exam.  This may seem unrealistic.  Try to put off lifting as long as possible for at this time the patient will continue to heal for the next 6-12 months.  If lifting is unavoidable, the patient is instructed not to hold their breath but blow

## 2024-12-12 NOTE — PROGRESS NOTES
Large Adult)   Pulse 76   LMP 12/09/2024   SpO2 100%     Physical Exam  Constitutional:       Appearance: Normal appearance. She is obese.   HENT:      Head: Normocephalic and atraumatic.      Right Ear: External ear normal.      Left Ear: External ear normal.      Nose: Nose normal.      Mouth/Throat:      Mouth: Mucous membranes are moist.      Pharynx: Oropharynx is clear.   Eyes:      Extraocular Movements: Extraocular movements intact.      Conjunctiva/sclera: Conjunctivae normal.   Cardiovascular:      Rate and Rhythm: Normal rate and regular rhythm.   Pulmonary:      Effort: Pulmonary effort is normal.      Breath sounds: Normal breath sounds.   Abdominal:      General: Abdomen is flat. Bowel sounds are normal.   Musculoskeletal:         General: Normal range of motion.      Cervical back: Normal range of motion and neck supple.   Neurological:      Mental Status: She is oriented to person, place, and time.   Psychiatric:         Mood and Affect: Mood normal.         Behavior: Behavior normal.   Vitals and nursing note reviewed.       ASSESSMENT/PLAN:    Anxiety  -     escitalopram (LEXAPRO) 10 MG tablet; Take 1 tablet by mouth daily, Disp-90 tablet, R-2Normal   Follow up with annual in the next 6 months.       The patient was counseled regarding review of all conditions discussed.     Orders Placed This Encounter   Medications    escitalopram (LEXAPRO) 10 MG tablet     Sig: Take 1 tablet by mouth daily     Dispense:  90 tablet     Refill:  2        2.   Educational handouts were discussed & given when applicable.     3.   Testing recommendations were given as indicated.     4.   Detailed questionnaires regarding the patient's specific condition were given to the patient when indicated. The patient understands that these are her responsibility to complete and return on a voluntary basis. Reminders to complete the questionnaires will not be given. The patient understands that failure to return the

## 2024-12-13 ENCOUNTER — OFFICE VISIT (OUTPATIENT)
Age: 44
End: 2024-12-13
Payer: COMMERCIAL

## 2024-12-13 VITALS — OXYGEN SATURATION: 100 % | HEART RATE: 76 BPM | SYSTOLIC BLOOD PRESSURE: 144 MMHG | DIASTOLIC BLOOD PRESSURE: 83 MMHG

## 2024-12-13 DIAGNOSIS — F41.9 ANXIETY: Primary | ICD-10-CM

## 2024-12-13 PROCEDURE — G8427 DOCREV CUR MEDS BY ELIG CLIN: HCPCS

## 2024-12-13 PROCEDURE — 1036F TOBACCO NON-USER: CPT

## 2024-12-13 PROCEDURE — G8417 CALC BMI ABV UP PARAM F/U: HCPCS

## 2024-12-13 PROCEDURE — G8484 FLU IMMUNIZE NO ADMIN: HCPCS

## 2024-12-13 PROCEDURE — 99213 OFFICE O/P EST LOW 20 MIN: CPT

## 2024-12-13 RX ORDER — ESCITALOPRAM OXALATE 10 MG/1
10 TABLET ORAL DAILY
Qty: 90 TABLET | Refills: 2 | Status: SHIPPED | OUTPATIENT
Start: 2024-12-13

## 2024-12-13 ASSESSMENT — ENCOUNTER SYMPTOMS: GASTROINTESTINAL NEGATIVE: 1

## 2024-12-19 ENCOUNTER — TELEPHONE (OUTPATIENT)
Age: 44
End: 2024-12-19

## 2024-12-19 NOTE — TELEPHONE ENCOUNTER
Pt calling for results of U/S that was done here in our office 12/12/24. It looks like it did not get released but the order was placed under the residents name so I was not sure where the results went

## (undated) DEVICE — SOLUTION IRRIG 3000ML 0.9% SOD CHL USP UROMATIC PLAS CONT

## (undated) DEVICE — STRIP SKIN CLSR W1XL5IN NYLON REINF CURAD STERIL

## (undated) DEVICE — COUNTER NDL 40 COUNT HLD 70 FOAM BLK ADH W/ MAG

## (undated) DEVICE — CONTROL SYRINGE LUER-LOCK TIP: Brand: MONOJECT

## (undated) DEVICE — TROCAR: Brand: KII FIOS FIRST ENTRY

## (undated) DEVICE — ELECTRODE PT RET AD L9FT HI MOIST COND ADH HYDRGEL CORDED

## (undated) DEVICE — ARM DRAPE

## (undated) DEVICE — SOLUTION IV 1000ML 0.9% SOD CHL PH 5 INJ USP VIAFLX PLAS

## (undated) DEVICE — SUTURE MONOCRYL + SZ 4-0 L27IN ABSRB UD L19MM PS-2 3/8 CIR MCP426H

## (undated) DEVICE — BLADELESS OBTURATOR: Brand: WECK VISTA

## (undated) DEVICE — STRAP RESTRAIN W3.5XL19IN TECLIN STRRP POS LEG DURING LITH

## (undated) DEVICE — SUTURE VICRYL SZ 0 L36IN ABSRB UD CT-1 L36MM 1/2 CIR TAPR PNT VCP946H

## (undated) DEVICE — DRAPE,UNDRBUT,WHT GRAD PCH,CAPPORT,20/CS: Brand: MEDLINE

## (undated) DEVICE — INSUFFLATION NEEDLE TO ESTABLISH PNEUMOPERITONEUM.: Brand: INSUFFLATION NEEDLE

## (undated) DEVICE — CATHETER URETH 16FR BLLN 5CC SIL ALLY W/ SIL HYDRGEL 2 W F

## (undated) DEVICE — SEAL

## (undated) DEVICE — PAD,SANITARY,11 IN,MAXI,W/WINGS,N-STRL: Brand: MEDLINE

## (undated) DEVICE — DEVICE TRCR 12X9X3IN WHT CLSR DISP OMNICLOSE

## (undated) DEVICE — SOLUTION IRRIG 1000ML 0.9% SOD CHL USP POUR PLAS BTL

## (undated) DEVICE — NEPTUNE E-SEP SMOKE EVACUATION PENCIL, COATED, 70MM BLADE, PUSH BUTTON SWITCH: Brand: NEPTUNE E-SEP

## (undated) DEVICE — PAD PT POS 36 IN SURGYPAD DISP

## (undated) DEVICE — DRAPE SURG W41XL74IN CLR FULL SZ C ARM 3 ADH POLY STRP E

## (undated) DEVICE — CRANIOTOMY DRAPE, STERILE: Brand: MEDLINE

## (undated) DEVICE — Device

## (undated) DEVICE — SET ENDOSCP SEAL HYSTEROSCOPE RIG OUTFLO CHN DISP MYOSURE

## (undated) DEVICE — MHPB GYN ROBOTIC PACK: Brand: MEDLINE INDUSTRIES, INC.

## (undated) DEVICE — TISSUE RETRIEVAL SYSTEM: Brand: INZII RETRIEVAL SYSTEM

## (undated) DEVICE — UNDERPANTS MAT L XL SEAMLESS CLR CODE WAISTBAND KNIT

## (undated) DEVICE — BARRIER ADH W3XL4IN UTER PELV ABSRB GYNECARE INTCEED

## (undated) DEVICE — FLUID MGMT SYS FLUENT KIT 6/PK

## (undated) DEVICE — TROCAR: Brand: KII® SLEEVE

## (undated) DEVICE — GLOVE ORANGE PI 7 1/2   MSG9075